# Patient Record
Sex: FEMALE | Race: WHITE | NOT HISPANIC OR LATINO | Employment: OTHER | URBAN - METROPOLITAN AREA
[De-identification: names, ages, dates, MRNs, and addresses within clinical notes are randomized per-mention and may not be internally consistent; named-entity substitution may affect disease eponyms.]

---

## 2023-04-18 ENCOUNTER — HOSPITAL ENCOUNTER (OUTPATIENT)
Dept: DATA CONVERSION | Facility: HOSPITAL | Age: 73
End: 2023-04-18
Attending: ORTHOPAEDIC SURGERY | Admitting: ORTHOPAEDIC SURGERY

## 2023-04-18 DIAGNOSIS — S52.571A OTHER INTRAARTICULAR FRACTURE OF LOWER END OF RIGHT RADIUS, INITIAL ENCOUNTER FOR CLOSED FRACTURE: ICD-10-CM

## 2023-04-18 DIAGNOSIS — Z87.891 PERSONAL HISTORY OF NICOTINE DEPENDENCE: ICD-10-CM

## 2023-04-18 DIAGNOSIS — K21.9 GASTRO-ESOPHAGEAL REFLUX DISEASE WITHOUT ESOPHAGITIS: ICD-10-CM

## 2023-04-18 DIAGNOSIS — E78.5 HYPERLIPIDEMIA, UNSPECIFIED: ICD-10-CM

## 2023-04-18 LAB
ANION GAP IN SER/PLAS: 13 MMOL/L (ref 10–20)
ATRIAL RATE: 77 BPM
CALCIUM (MG/DL) IN SER/PLAS: 9.1 MG/DL (ref 8.6–10.3)
CARBON DIOXIDE, TOTAL (MMOL/L) IN SER/PLAS: 26 MMOL/L (ref 21–32)
CHLORIDE (MMOL/L) IN SER/PLAS: 104 MMOL/L (ref 98–107)
CREATININE (MG/DL) IN SER/PLAS: 0.86 MG/DL (ref 0.5–1.05)
ERYTHROCYTE DISTRIBUTION WIDTH (RATIO) BY AUTOMATED COUNT: 12.7 % (ref 11.5–14.5)
ERYTHROCYTE MEAN CORPUSCULAR HEMOGLOBIN CONCENTRATION (G/DL) BY AUTOMATED: 31.8 G/DL (ref 32–36)
ERYTHROCYTE MEAN CORPUSCULAR VOLUME (FL) BY AUTOMATED COUNT: 87 FL (ref 80–100)
ERYTHROCYTES (10*6/UL) IN BLOOD BY AUTOMATED COUNT: 4.86 X10E12/L (ref 4–5.2)
GFR FEMALE: 71 ML/MIN/1.73M2
GLUCOSE (MG/DL) IN SER/PLAS: 91 MG/DL (ref 74–99)
HEMATOCRIT (%) IN BLOOD BY AUTOMATED COUNT: 42.1 % (ref 36–46)
HEMOGLOBIN (G/DL) IN BLOOD: 13.4 G/DL (ref 12–16)
LEUKOCYTES (10*3/UL) IN BLOOD BY AUTOMATED COUNT: 7.4 X10E9/L (ref 4.4–11.3)
P AXIS: 49 DEGREES
PLATELETS (10*3/UL) IN BLOOD AUTOMATED COUNT: 286 X10E9/L (ref 150–450)
POTASSIUM (MMOL/L) IN SER/PLAS: 4.1 MMOL/L (ref 3.5–5.3)
PR INTERVAL: 152 MS
Q ONSET: 249 MS
QRS COUNT: 12 BEATS
QRS DURATION: 82 MS
QT INTERVAL: 390 MS
QTC CALCULATION(BAZETT): 436 MS
QTC FREDERICIA: 419 MS
R AXIS: 4 DEGREES
SODIUM (MMOL/L) IN SER/PLAS: 139 MMOL/L (ref 136–145)
T AXIS: 36 DEGREES
T OFFSET: 444 MS
UREA NITROGEN (MG/DL) IN SER/PLAS: 16 MG/DL (ref 6–23)
VENTRICULAR RATE: 75 BPM

## 2023-09-07 VITALS — BODY MASS INDEX: 21.48 KG/M2 | WEIGHT: 121.25 LBS | HEIGHT: 63 IN

## 2023-09-14 NOTE — PROGRESS NOTES
Service: Orthopaedics     Subjective Data:   AMIRAH PAGAN is a 72 year old Female who is Hospital Day # 1.    Objective Data:     Objective Information:    ORTHOPEDIC OPERATIVE NOTE    Name: Amirah Pagan  : 50  Surgeon: Ahmet Beavers DO  Facility: [facility]  Date of Surgery: 23     SURGEON:     Ahmet Beavers DO  ASSISTANT:  SA Hercules  PREOPERATIVE DIAGNOSIS: Closed, 3 part intra-articular fracture, right distal radius  POSTOPERATIVE DIAGNOSIS: Closed, 3 part intra-articular fracture, right distal radius  PROCEDURE:   Open reduction internal fixation with volar plate.  ANESTHESIA:    MAC and Block  BLOOD LOSS: Minimal    PROCEDURE: The patient was seen and consented preoperatively with the side and site of surgery appropriately marked. The patient was taken back to operative suite, placed supine on the operative table, and placed on monitor for the duration of the case.  The patient was administered sedation and monitored throughout the entire surgery by Department of Anesthesia. The patient had recieved a block pre-operatively by the department of anesthesia.  While sedated, the right upper extremity was sterilely prepped  and draped in the sterile orthopedic fashion, elevated with an Esmarch, tourniquet inflated to 250 mmHg for duration of case. A time-out was performed confirming the site of surgery and surgery to be performed.    A longitudinal incision was made over the volar radial aspect of the wrist. Dissection was taken through the skin and subcutaneous tissue using electrocautery as necessary. The flexor carpi radialis tendon was identified and its sheath was released as  far as possible. The branch of the radial artery was protected.    An incision was made through the forearm fascia adjacent to the flexor carpi radialis along the radial aspect. Blunt dissection was taken through the muscles until the pronator quadratus was visualized. It was released sharply off the  radius and again,  hemostasis with electrocautery was performed as necessary.    The insertion of the brachioradialis was released to neutralize the displacement force on the distal radial fragment. Care was taken to avoid injury to any of the 1st compartment extensor tendons.    The 3 part intra-articular fracture was identified. The shaft of the radius was gently retracted away from the distal fragment to clear it of fracture hematoma.    Fracture reduction was then performed manually under direct visualization until satisfactory alignment was identified. This was supplemented with K-wires as necessary.    The plate was applied to the volar aspect. C-arm was then used to assure proper alignments. Adjustments were made as necessary. First screw was placed in the oblong hole. The distal screws were then inserted using the guides in the usual manner.    The plate was then reduced along the shaft, further reducing the fracture and reproducing the volar tilt. The shaft of the plate was secured with cortical screws in the usual manner. Once again, the procedure was performed with fluoroscopy to ensure satisfactory  reduction. The pronator quadratus was placed back in its normal position.    At the completion of the procedure, the wrist was placed through range of motion and noted to be essentially normal with good stability of the fracture.  DRUJ was stressed and found to be stable.     The tourniquet was then released. The wound was thoroughly irrigated with antibiotic solution. Hemostasis was acquired with pressure and electrocautery as necessary. The subcutaneous tissue was closed with 4-0 vicryl and the skin was closed with running  5-0 nylon. A soft bulky dressing was applied along with a volar splint. The patient was taken to the recovery room in satisfactory condition.     Electronically signed  Ahmet Beavers DO     Recent Lab Results:    Results:    CBC: 4/18/2023 09:41              \     Hgb     /                               \     13.4       /  WBC  ----------------  Plt               7.4       ----------------    286              /     Hct     \                              /     42.1       \            RBC: 4.86     MCV: 87           BMP: 4/18/2023 09:41  NA+        Cl-     BUN  /                         139    104    16  /  --------------------------------  Glucose                ---------------------------  91    K+     HCO3-   Creat \                         4.1  26    0.86  \  Calcium : 9.1     Anion Gap : 13      Assessment and Plan:   Code Status:  ·  Code Status Full Code     Advance Care Planning:  Advance Care Planning: Patient didn't wish to or  was unable to provide advance care plan       Electronic Signatures:  JULIA Beavers James (DO)  (Signed 18-Apr-2023 11:40)   Authored: Service, Subjective Data, Objective Data, Assessment  and Plan, Note Completion      Last Updated: 18-Apr-2023 11:40 by JULIA Beavers James ()

## 2023-09-14 NOTE — H&P
History & Physical Reviewed:   I have reviewed the History and Physical dated:  10-Apr-2023   History and Physical reviewed and relevant findings noted. Patient examined to review pertinent physical  findings.: No significant changes   Home Medications Reviewed: no changes noted   Allergies Reviewed: no changes noted       ERAS (Enhanced Recovery After Surgery):  ·  ERAS Patient: no     Consent:   COVID-19 Consent:  ·  COVID-19 Risk Consent Surgeon has reviewed key risks related to the risk of joseph COVID-19 and if they contract COVID-19 what the risks are.       Electronic Signatures:  JULIA Beavers James ()  (Signed 18-Apr-2023 10:43)   Authored: History & Physical Reviewed, ERAS, Consent,  Note Completion      Last Updated: 18-Apr-2023 10:43 by JULIA Beavers James ()

## 2023-11-02 ENCOUNTER — TELEPHONE (OUTPATIENT)
Dept: PRIMARY CARE | Facility: CLINIC | Age: 73
End: 2023-11-02

## 2023-11-02 PROBLEM — E78.5 DYSLIPIDEMIA, GOAL LDL BELOW 100: Status: ACTIVE | Noted: 2023-11-02

## 2023-11-02 PROBLEM — K21.9 GERD (GASTROESOPHAGEAL REFLUX DISEASE): Status: ACTIVE | Noted: 2023-11-02

## 2023-11-02 PROBLEM — S01.81XA LACERATION OF FACE: Status: ACTIVE | Noted: 2023-11-02

## 2023-11-02 PROBLEM — M25.60 STIFFNESS IN JOINT: Status: ACTIVE | Noted: 2023-11-02

## 2023-11-02 PROBLEM — S52.571D: Status: ACTIVE | Noted: 2023-11-02

## 2023-11-02 PROBLEM — F32.A ANXIETY AND DEPRESSION: Status: ACTIVE | Noted: 2023-11-02

## 2023-11-02 PROBLEM — M25.572 ACUTE LEFT ANKLE PAIN: Status: ACTIVE | Noted: 2023-11-02

## 2023-11-02 PROBLEM — S62.109A WRIST FRACTURE: Status: ACTIVE | Noted: 2023-11-02

## 2023-11-02 PROBLEM — F41.9 ANXIETY AND DEPRESSION: Status: ACTIVE | Noted: 2023-11-02

## 2023-11-02 PROBLEM — M79.672 ACUTE PAIN OF LEFT FOOT: Status: ACTIVE | Noted: 2023-11-02

## 2023-11-02 PROBLEM — R53.1 WEAKNESS: Status: ACTIVE | Noted: 2023-11-02

## 2023-11-02 PROBLEM — S09.90XA HEAD INJURY, CLOSED: Status: ACTIVE | Noted: 2023-11-02

## 2023-11-02 RX ORDER — SIMVASTATIN 40 MG/1
40 TABLET, FILM COATED ORAL NIGHTLY
COMMUNITY
End: 2024-03-28 | Stop reason: SDUPTHER

## 2023-11-02 RX ORDER — PAROXETINE HYDROCHLORIDE 20 MG/1
20 TABLET, FILM COATED ORAL
COMMUNITY
End: 2023-11-15

## 2023-11-02 NOTE — TELEPHONE ENCOUNTER
Requesting refill on Paroxetine 20 mg  & Simvastatin 40 mg  90 day supply     To [facility]    **She has not been seen since 9-

## 2023-11-15 DIAGNOSIS — F41.9 ANXIETY: ICD-10-CM

## 2023-11-15 RX ORDER — PAROXETINE HYDROCHLORIDE 20 MG/1
20 TABLET, FILM COATED ORAL
Qty: 90 TABLET | Refills: 0 | Status: SHIPPED | OUTPATIENT
Start: 2023-11-15 | End: 2024-03-28 | Stop reason: SDUPTHER

## 2024-02-06 ENCOUNTER — APPOINTMENT (OUTPATIENT)
Dept: PRIMARY CARE | Facility: CLINIC | Age: 74
End: 2024-02-06
Payer: MEDICARE

## 2024-03-28 ENCOUNTER — OFFICE VISIT (OUTPATIENT)
Dept: PRIMARY CARE | Facility: CLINIC | Age: 74
End: 2024-03-28
Payer: MEDICARE

## 2024-03-28 VITALS
WEIGHT: 117 LBS | DIASTOLIC BLOOD PRESSURE: 60 MMHG | HEART RATE: 62 BPM | BODY MASS INDEX: 20.73 KG/M2 | HEIGHT: 63 IN | SYSTOLIC BLOOD PRESSURE: 110 MMHG

## 2024-03-28 DIAGNOSIS — F32.5 MAJOR DEPRESSION IN REMISSION (CMS-HCC): ICD-10-CM

## 2024-03-28 DIAGNOSIS — M80.00XD AGE-RELATED OSTEOPOROSIS WITH CURRENT PATHOLOGICAL FRACTURE WITH ROUTINE HEALING: ICD-10-CM

## 2024-03-28 DIAGNOSIS — Z12.11 COLON CANCER SCREENING: ICD-10-CM

## 2024-03-28 DIAGNOSIS — K21.9 GASTROESOPHAGEAL REFLUX DISEASE WITHOUT ESOPHAGITIS: ICD-10-CM

## 2024-03-28 DIAGNOSIS — E78.5 DYSLIPIDEMIA, GOAL LDL BELOW 100: ICD-10-CM

## 2024-03-28 DIAGNOSIS — Z12.31 SCREENING MAMMOGRAM FOR BREAST CANCER: ICD-10-CM

## 2024-03-28 DIAGNOSIS — E55.9 VITAMIN D DEFICIENCY: ICD-10-CM

## 2024-03-28 DIAGNOSIS — N39.3 STRESS INCONTINENCE IN FEMALE: ICD-10-CM

## 2024-03-28 DIAGNOSIS — F41.9 ANXIETY: ICD-10-CM

## 2024-03-28 DIAGNOSIS — Z00.00 MEDICARE ANNUAL WELLNESS VISIT, SUBSEQUENT: Primary | ICD-10-CM

## 2024-03-28 PROBLEM — S09.90XA HEAD INJURY, CLOSED: Status: RESOLVED | Noted: 2023-11-02 | Resolved: 2024-03-28

## 2024-03-28 PROBLEM — M81.0 AGE-RELATED OSTEOPOROSIS WITHOUT CURRENT PATHOLOGICAL FRACTURE: Status: ACTIVE | Noted: 2024-03-28

## 2024-03-28 PROBLEM — F32.A ANXIETY AND DEPRESSION: Status: RESOLVED | Noted: 2023-11-02 | Resolved: 2024-03-28

## 2024-03-28 PROBLEM — M25.60 STIFFNESS IN JOINT: Status: RESOLVED | Noted: 2023-11-02 | Resolved: 2024-03-28

## 2024-03-28 PROBLEM — S01.81XA LACERATION OF FACE: Status: RESOLVED | Noted: 2023-11-02 | Resolved: 2024-03-28

## 2024-03-28 PROBLEM — M79.672 ACUTE PAIN OF LEFT FOOT: Status: RESOLVED | Noted: 2023-11-02 | Resolved: 2024-03-28

## 2024-03-28 PROBLEM — M25.572 ACUTE LEFT ANKLE PAIN: Status: RESOLVED | Noted: 2023-11-02 | Resolved: 2024-03-28

## 2024-03-28 PROBLEM — R53.1 WEAKNESS: Status: RESOLVED | Noted: 2023-11-02 | Resolved: 2024-03-28

## 2024-03-28 PROBLEM — S52.571D: Status: RESOLVED | Noted: 2023-11-02 | Resolved: 2024-03-28

## 2024-03-28 PROBLEM — S62.109A WRIST FRACTURE: Status: RESOLVED | Noted: 2023-11-02 | Resolved: 2024-03-28

## 2024-03-28 PROCEDURE — 1160F RVW MEDS BY RX/DR IN RCRD: CPT | Performed by: INTERNAL MEDICINE

## 2024-03-28 PROCEDURE — 99214 OFFICE O/P EST MOD 30 MIN: CPT | Performed by: INTERNAL MEDICINE

## 2024-03-28 PROCEDURE — 1036F TOBACCO NON-USER: CPT | Performed by: INTERNAL MEDICINE

## 2024-03-28 PROCEDURE — 1159F MED LIST DOCD IN RCRD: CPT | Performed by: INTERNAL MEDICINE

## 2024-03-28 PROCEDURE — G0446 INTENS BEHAVE THER CARDIO DX: HCPCS | Performed by: INTERNAL MEDICINE

## 2024-03-28 PROCEDURE — 99397 PER PM REEVAL EST PAT 65+ YR: CPT | Performed by: INTERNAL MEDICINE

## 2024-03-28 PROCEDURE — G0444 DEPRESSION SCREEN ANNUAL: HCPCS | Performed by: INTERNAL MEDICINE

## 2024-03-28 PROCEDURE — 1170F FXNL STATUS ASSESSED: CPT | Performed by: INTERNAL MEDICINE

## 2024-03-28 PROCEDURE — G0439 PPPS, SUBSEQ VISIT: HCPCS | Performed by: INTERNAL MEDICINE

## 2024-03-28 PROCEDURE — 99497 ADVNCD CARE PLAN 30 MIN: CPT | Performed by: INTERNAL MEDICINE

## 2024-03-28 RX ORDER — PAROXETINE HYDROCHLORIDE 20 MG/1
20 TABLET, FILM COATED ORAL
Qty: 90 TABLET | Refills: 3 | Status: SHIPPED | OUTPATIENT
Start: 2024-03-28 | End: 2025-03-28

## 2024-03-28 RX ORDER — SIMVASTATIN 40 MG/1
40 TABLET, FILM COATED ORAL NIGHTLY
Qty: 90 TABLET | Refills: 3 | Status: SHIPPED | OUTPATIENT
Start: 2024-03-28 | End: 2025-03-28

## 2024-03-28 ASSESSMENT — ACTIVITIES OF DAILY LIVING (ADL)
DRESSING: INDEPENDENT
TAKING_MEDICATION: INDEPENDENT
GROCERY_SHOPPING: INDEPENDENT
DOING_HOUSEWORK: INDEPENDENT
MANAGING_FINANCES: INDEPENDENT
BATHING: INDEPENDENT

## 2024-03-28 ASSESSMENT — PATIENT HEALTH QUESTIONNAIRE - PHQ9
1. LITTLE INTEREST OR PLEASURE IN DOING THINGS: NOT AT ALL
SUM OF ALL RESPONSES TO PHQ9 QUESTIONS 1 AND 2: 0
2. FEELING DOWN, DEPRESSED OR HOPELESS: NOT AT ALL

## 2024-03-28 ASSESSMENT — ANXIETY QUESTIONNAIRES
7. FEELING AFRAID AS IF SOMETHING AWFUL MIGHT HAPPEN: NOT AT ALL
6. BECOMING EASILY ANNOYED OR IRRITABLE: NOT AT ALL
5. BEING SO RESTLESS THAT IT IS HARD TO SIT STILL: NOT AT ALL
2. NOT BEING ABLE TO STOP OR CONTROL WORRYING: NOT AT ALL
1. FEELING NERVOUS, ANXIOUS, OR ON EDGE: SEVERAL DAYS
IF YOU CHECKED OFF ANY PROBLEMS ON THIS QUESTIONNAIRE, HOW DIFFICULT HAVE THESE PROBLEMS MADE IT FOR YOU TO DO YOUR WORK, TAKE CARE OF THINGS AT HOME, OR GET ALONG WITH OTHER PEOPLE: NOT DIFFICULT AT ALL
4. TROUBLE RELAXING: NOT AT ALL
3. WORRYING TOO MUCH ABOUT DIFFERENT THINGS: NOT AT ALL
GAD7 TOTAL SCORE: 1

## 2024-03-28 ASSESSMENT — COLUMBIA-SUICIDE SEVERITY RATING SCALE - C-SSRS
1. IN THE PAST MONTH, HAVE YOU WISHED YOU WERE DEAD OR WISHED YOU COULD GO TO SLEEP AND NOT WAKE UP?: NO
2. HAVE YOU ACTUALLY HAD ANY THOUGHTS OF KILLING YOURSELF?: NO
6. HAVE YOU EVER DONE ANYTHING, STARTED TO DO ANYTHING, OR PREPARED TO DO ANYTHING TO END YOUR LIFE?: NO

## 2024-03-28 ASSESSMENT — ENCOUNTER SYMPTOMS
OCCASIONAL FEELINGS OF UNSTEADINESS: 0
LOSS OF SENSATION IN FEET: 0
DEPRESSION: 0

## 2024-03-28 NOTE — PROGRESS NOTES
"Subjective   Reason for Visit: Jess Pagan is an 73 y.o. female here for a Medicare Wellness visit.     Past Medical, Surgical, and Family History reviewed and updated in chart.    Reviewed all medications by prescribing practitioner or clinical pharmacist (such as prescriptions, OTCs, herbal therapies and supplements) and documented in the medical record.    HPI    Patient Care Team:  Mayo Rosas DO as PCP - General  ROYCE Hahn-CNP as PCP - [employer] PCP     Review of Systems    Objective   Vitals:  /60 (BP Location: Left arm, Patient Position: Sitting)   Pulse 62   Ht 1.6 m (5' 3\")   Wt 53.1 kg (117 lb)   BMI 20.73 kg/m²       Physical Exam    Assessment/Plan   Problem List Items Addressed This Visit    None         "

## 2024-03-28 NOTE — ASSESSMENT & PLAN NOTE
Patient with fragility fracture after sustaining fall approximately 1 year ago with plating of her right wrist by orthopedic surgery stable at this time evaluated for bone density for treatment of osteoporosis

## 2024-03-28 NOTE — PROGRESS NOTES
"Subjective   Reason for Visit: Jess Pagan is an 73 y.o. female here for a Medicare Wellness [facility] Depression and anxiety screening completed   PHQ9 score   GAD7 score   I spent 15 minutes obtaining and discussing depression screening using PHQ 2 questions with results documented in chart.  Screening using PHQ-9 and PRICE-7 scores were used for follow-up with treatment and referral plan discussed.      I spent 15 minutes face-to-face with this individual discussing the cardiovascular risk and behavioral therapies of nutritional choices exercise and elimination of habits contributing to risk .We agreed on a plan how they may be able to reduce  current cardiovascular risk.  Per patient with calculation greater than 10% aspirin use was discussed and encouraged unless known allergy or increased risk of bleeding contraindicates use patient's 10-year CV risk estimate calculates:I spent greater than 15 minutes discussing advance care planning including the explanation and discussion of advanced directives.  If patient does not have current up-to-date documents examples and information provided on how to create both living will and power of .  toolkit was given to patient and was encouraged to work out completing these documents.visit.     Past Medical, Surgical, and Family History reviewed and updated in chart.    Reviewed all medications by prescribing practitioner or clinical pharmacist (such as prescriptions, OTCs, herbal therapies and supplements) and documented in the medical record.    HPI    Patient Care Team:  Mayo Rosas DO as PCP - General  ROYCE Hahn-CNP as PCP - [employer] PCP     Review of Systems   All other systems reviewed and are negative.      Objective   Vitals:  /60 (BP Location: Left arm, Patient Position: Sitting)   Pulse 62   Ht 1.6 m (5' 3\")   Wt 53.1 kg (117 lb)   BMI 20.73 kg/m²       Physical Exam  Vitals and nursing note " reviewed.   Constitutional:       General: She is not in acute distress.     Appearance: Normal appearance. She is well-developed. She is not toxic-appearing.   HENT:      Head: Normocephalic and atraumatic.      Right Ear: Tympanic membrane and external ear normal.      Left Ear: Tympanic membrane and external ear normal.      Nose: Nose normal.      Mouth/Throat:      Mouth: Mucous membranes are moist.      Pharynx: Oropharynx is clear. No oropharyngeal exudate or posterior oropharyngeal erythema.      Tonsils: No tonsillar exudate. 2+ on the right. 2+ on the left.   Eyes:      Extraocular Movements: Extraocular movements intact.      Conjunctiva/sclera: Conjunctivae normal.   Cardiovascular:      Rate and Rhythm: Normal rate and regular rhythm.      Pulses: Normal pulses.      Heart sounds: Normal heart sounds. No murmur heard.  Pulmonary:      Effort: Pulmonary effort is normal.      Breath sounds: Normal breath sounds.   Abdominal:      General: Abdomen is flat. Bowel sounds are normal.      Palpations: Abdomen is soft.   Musculoskeletal:      Cervical back: Neck supple.   Lymphadenopathy:      Cervical: No cervical adenopathy.   Skin:     General: Skin is warm and dry.      Findings: No rash.   Neurological:      Mental Status: She is alert. Mental status is at baseline.   Psychiatric:         Mood and Affect: Mood normal.         Behavior: Behavior normal.         Thought Content: Thought content normal.         Judgment: Judgment normal.         Assessment/Plan   Problem List Items Addressed This Visit       Anxiety    Relevant Medications    PARoxetine (Paxil) 20 mg tablet    Dyslipidemia, goal LDL below 100    Current Assessment & Plan     Reevaluate fasting lab work on simvastatin 40 mg daily for reduction cardiometabolic risk         Relevant Medications    simvastatin (Zocor) 40 mg tablet    Other Relevant Orders    BI mammo bilateral screening tomosynthesis    XR DEXA bone density    Cologuard® colon  cancer screening    Hepatitis C antibody    Vitamin D 25-Hydroxy,Total (for eval of Vitamin D levels)    Comprehensive metabolic panel    Lipid Panel    CBC and Auto Differential    Follow Up In [facility] - PCP - Established    GERD (gastroesophageal reflux disease)    Current Assessment & Plan     Symptoms controlled on as needed use of over-the-counter omeprazole 20 mg daily         Medicare annual wellness visit, subsequent - Primary    Current Assessment & Plan     Up-to-date with vaccinations.  Schedule annual screening mammogram.  Bone density scheduled for osteoporosis.  Cologuard testing for colon cancer screening.  Send advanced medical directives paperwork to evaluate for next visit discussed advanced medical directives         Age-related osteoporosis with current pathological fracture with routine healing    Current Assessment & Plan     Patient with fragility fracture after sustaining fall approximately 1 year ago with plating of her right wrist by orthopedic surgery stable at this time evaluated for bone density for treatment of osteoporosis         Relevant Orders    Follow Up In [facility] - PCP - Established    Major depression in remission (CMS/HCC)    Current Assessment & Plan     Stable continue paroxetine 20 mg daily         Relevant Orders    Follow Up In [facility] - PCP - Established    Colon cancer screening    Relevant Orders    Cologuard® colon cancer screening    Vitamin D deficiency    Current Assessment & Plan     Evaluate vitamin D levels reevaluate bone density for osteoporosis with recent fracture of right wrist         Relevant Orders    Vitamin D 25-Hydroxy,Total (for eval of Vitamin D levels)    Screening mammogram for breast cancer    Relevant Orders    BI mammo bilateral screening tomosynthesis    Stress incontinence in female    Current Assessment & Plan     Have patient complete paperwork on enrolling in empower study reduce caffeine intake  drinks 1 to 2 cups of coffee a day occasional carbonated beverage could benefit from pelvic floor rehabilitation exercises rehabilitation physical therapy reevaluate next visit

## 2024-03-28 NOTE — ASSESSMENT & PLAN NOTE
Evaluate vitamin D levels reevaluate bone density for osteoporosis with recent fracture of right wrist

## 2024-03-28 NOTE — ASSESSMENT & PLAN NOTE
Up-to-date with vaccinations.  Schedule annual screening mammogram.  Bone density scheduled for osteoporosis.  Cologuard testing for colon cancer screening.  Send advanced medical directives paperwork to evaluate for next visit discussed advanced medical directives

## 2024-03-28 NOTE — ASSESSMENT & PLAN NOTE
Have patient complete paperwork on enrolling in empower study reduce caffeine intake drinks 1 to 2 cups of coffee a day occasional carbonated beverage could benefit from pelvic floor rehabilitation exercises rehabilitation physical therapy reevaluate next visit

## 2024-04-10 LAB — NONINV COLON CA DNA+OCC BLD SCRN STL QL: NEGATIVE

## 2024-05-06 ENCOUNTER — HOSPITAL ENCOUNTER (OUTPATIENT)
Dept: RADIOLOGY | Facility: CLINIC | Age: 74
Discharge: HOME | End: 2024-05-06
Payer: MEDICARE

## 2024-05-06 VITALS — HEIGHT: 62 IN | WEIGHT: 117 LBS | BODY MASS INDEX: 21.53 KG/M2

## 2024-05-06 DIAGNOSIS — E78.5 DYSLIPIDEMIA, GOAL LDL BELOW 100: ICD-10-CM

## 2024-05-06 DIAGNOSIS — Z12.31 SCREENING MAMMOGRAM FOR BREAST CANCER: ICD-10-CM

## 2024-05-06 PROCEDURE — 77067 SCR MAMMO BI INCL CAD: CPT

## 2024-05-06 PROCEDURE — 77080 DXA BONE DENSITY AXIAL: CPT

## 2024-05-06 PROCEDURE — 77063 BREAST TOMOSYNTHESIS BI: CPT | Performed by: RADIOLOGY

## 2024-05-06 PROCEDURE — 77067 SCR MAMMO BI INCL CAD: CPT | Performed by: RADIOLOGY

## 2024-05-10 ENCOUNTER — TELEPHONE (OUTPATIENT)
Dept: PRIMARY CARE | Facility: CLINIC | Age: 74
End: 2024-05-10
Payer: MEDICARE

## 2024-07-03 ENCOUNTER — HOSPITAL ENCOUNTER (OUTPATIENT)
Dept: RADIOLOGY | Facility: CLINIC | Age: 74
Discharge: HOME | End: 2024-07-03
Payer: MEDICARE

## 2024-07-03 PROCEDURE — 77080 DXA BONE DENSITY AXIAL: CPT | Mod: RSC

## 2024-09-19 ENCOUNTER — LAB (OUTPATIENT)
Dept: LAB | Facility: LAB | Age: 74
End: 2024-09-19
Payer: MEDICARE

## 2024-09-19 DIAGNOSIS — E55.9 VITAMIN D DEFICIENCY: ICD-10-CM

## 2024-09-19 DIAGNOSIS — E78.5 DYSLIPIDEMIA, GOAL LDL BELOW 100: ICD-10-CM

## 2024-09-19 LAB
25(OH)D3 SERPL-MCNC: 56 NG/ML (ref 30–100)
ALBUMIN SERPL BCP-MCNC: 4.2 G/DL (ref 3.4–5)
ALP SERPL-CCNC: 83 U/L (ref 33–136)
ALT SERPL W P-5'-P-CCNC: 11 U/L (ref 7–45)
ANION GAP SERPL CALC-SCNC: 10 MMOL/L (ref 10–20)
AST SERPL W P-5'-P-CCNC: 20 U/L (ref 9–39)
BASOPHILS # BLD AUTO: 0.07 X10*3/UL (ref 0–0.1)
BASOPHILS NFR BLD AUTO: 1.2 %
BILIRUB SERPL-MCNC: 0.3 MG/DL (ref 0–1.2)
BUN SERPL-MCNC: 12 MG/DL (ref 6–23)
CALCIUM SERPL-MCNC: 9 MG/DL (ref 8.6–10.3)
CHLORIDE SERPL-SCNC: 104 MMOL/L (ref 98–107)
CHOLEST SERPL-MCNC: 190 MG/DL (ref 0–199)
CHOLESTEROL/HDL RATIO: 2.8
CO2 SERPL-SCNC: 31 MMOL/L (ref 21–32)
CREAT SERPL-MCNC: 0.89 MG/DL (ref 0.5–1.05)
EGFRCR SERPLBLD CKD-EPI 2021: 68 ML/MIN/1.73M*2
EOSINOPHIL # BLD AUTO: 0.21 X10*3/UL (ref 0–0.4)
EOSINOPHIL NFR BLD AUTO: 3.5 %
ERYTHROCYTE [DISTWIDTH] IN BLOOD BY AUTOMATED COUNT: 13.2 % (ref 11.5–14.5)
GLUCOSE SERPL-MCNC: 90 MG/DL (ref 74–99)
HCT VFR BLD AUTO: 43.2 % (ref 36–46)
HCV AB SER QL: NONREACTIVE
HDLC SERPL-MCNC: 68.9 MG/DL
HGB BLD-MCNC: 13.5 G/DL (ref 12–16)
IMM GRANULOCYTES # BLD AUTO: 0.03 X10*3/UL (ref 0–0.5)
IMM GRANULOCYTES NFR BLD AUTO: 0.5 % (ref 0–0.9)
LDLC SERPL CALC-MCNC: 100 MG/DL
LYMPHOCYTES # BLD AUTO: 2.65 X10*3/UL (ref 0.8–3)
LYMPHOCYTES NFR BLD AUTO: 44.8 %
MCH RBC QN AUTO: 27.9 PG (ref 26–34)
MCHC RBC AUTO-ENTMCNC: 31.3 G/DL (ref 32–36)
MCV RBC AUTO: 89 FL (ref 80–100)
MONOCYTES # BLD AUTO: 0.41 X10*3/UL (ref 0.05–0.8)
MONOCYTES NFR BLD AUTO: 6.9 %
NEUTROPHILS # BLD AUTO: 2.55 X10*3/UL (ref 1.6–5.5)
NEUTROPHILS NFR BLD AUTO: 43.1 %
NON HDL CHOLESTEROL: 121 MG/DL (ref 0–149)
NRBC BLD-RTO: 0 /100 WBCS (ref 0–0)
PLATELET # BLD AUTO: 270 X10*3/UL (ref 150–450)
POTASSIUM SERPL-SCNC: 4.7 MMOL/L (ref 3.5–5.3)
PROT SERPL-MCNC: 6.6 G/DL (ref 6.4–8.2)
RBC # BLD AUTO: 4.84 X10*6/UL (ref 4–5.2)
SODIUM SERPL-SCNC: 140 MMOL/L (ref 136–145)
TRIGL SERPL-MCNC: 107 MG/DL (ref 0–149)
VLDL: 21 MG/DL (ref 0–40)
WBC # BLD AUTO: 5.9 X10*3/UL (ref 4.4–11.3)

## 2024-09-19 PROCEDURE — 82306 VITAMIN D 25 HYDROXY: CPT

## 2024-09-19 PROCEDURE — 80053 COMPREHEN METABOLIC PANEL: CPT

## 2024-09-19 PROCEDURE — 86803 HEPATITIS C AB TEST: CPT

## 2024-09-19 PROCEDURE — 80061 LIPID PANEL: CPT

## 2024-09-19 PROCEDURE — 85025 COMPLETE CBC W/AUTO DIFF WBC: CPT

## 2024-09-19 PROCEDURE — 36415 COLL VENOUS BLD VENIPUNCTURE: CPT

## 2024-09-26 ENCOUNTER — APPOINTMENT (OUTPATIENT)
Dept: PRIMARY CARE | Facility: CLINIC | Age: 74
End: 2024-09-26
Payer: MEDICARE

## 2024-09-26 VITALS
HEART RATE: 68 BPM | DIASTOLIC BLOOD PRESSURE: 62 MMHG | BODY MASS INDEX: 21.71 KG/M2 | WEIGHT: 118 LBS | SYSTOLIC BLOOD PRESSURE: 110 MMHG | HEIGHT: 62 IN

## 2024-09-26 DIAGNOSIS — N39.46 MIXED STRESS AND URGE URINARY INCONTINENCE: ICD-10-CM

## 2024-09-26 DIAGNOSIS — F32.5 MAJOR DEPRESSION IN REMISSION (CMS-HCC): ICD-10-CM

## 2024-09-26 DIAGNOSIS — E78.5 DYSLIPIDEMIA, GOAL LDL BELOW 100: ICD-10-CM

## 2024-09-26 DIAGNOSIS — M80.00XD AGE-RELATED OSTEOPOROSIS WITH CURRENT PATHOLOGICAL FRACTURE WITH ROUTINE HEALING: Primary | ICD-10-CM

## 2024-09-26 DIAGNOSIS — E55.9 VITAMIN D DEFICIENCY: ICD-10-CM

## 2024-09-26 PROBLEM — F41.9 ANXIETY: Status: RESOLVED | Noted: 2023-11-02 | Resolved: 2024-09-26

## 2024-09-26 PROCEDURE — 1159F MED LIST DOCD IN RCRD: CPT | Performed by: INTERNAL MEDICINE

## 2024-09-26 PROCEDURE — 1123F ACP DISCUSS/DSCN MKR DOCD: CPT | Performed by: INTERNAL MEDICINE

## 2024-09-26 PROCEDURE — 99213 OFFICE O/P EST LOW 20 MIN: CPT | Performed by: INTERNAL MEDICINE

## 2024-09-26 PROCEDURE — 1158F ADVNC CARE PLAN TLK DOCD: CPT | Performed by: INTERNAL MEDICINE

## 2024-09-26 PROCEDURE — 3008F BODY MASS INDEX DOCD: CPT | Performed by: INTERNAL MEDICINE

## 2024-09-26 PROCEDURE — 1160F RVW MEDS BY RX/DR IN RCRD: CPT | Performed by: INTERNAL MEDICINE

## 2024-09-26 PROCEDURE — 1036F TOBACCO NON-USER: CPT | Performed by: INTERNAL MEDICINE

## 2024-09-26 RX ORDER — ALENDRONATE SODIUM 70 MG/1
70 TABLET ORAL
Qty: 12 TABLET | Refills: 3 | Status: SHIPPED | OUTPATIENT
Start: 2024-09-26 | End: 2025-09-26

## 2024-09-26 ASSESSMENT — ANXIETY QUESTIONNAIRES
IF YOU CHECKED OFF ANY PROBLEMS ON THIS QUESTIONNAIRE, HOW DIFFICULT HAVE THESE PROBLEMS MADE IT FOR YOU TO DO YOUR WORK, TAKE CARE OF THINGS AT HOME, OR GET ALONG WITH OTHER PEOPLE: NOT DIFFICULT AT ALL
7. FEELING AFRAID AS IF SOMETHING AWFUL MIGHT HAPPEN: NOT AT ALL
5. BEING SO RESTLESS THAT IT IS HARD TO SIT STILL: NOT AT ALL
2. NOT BEING ABLE TO STOP OR CONTROL WORRYING: NOT AT ALL
3. WORRYING TOO MUCH ABOUT DIFFERENT THINGS: NOT AT ALL
1. FEELING NERVOUS, ANXIOUS, OR ON EDGE: NOT AT ALL
6. BECOMING EASILY ANNOYED OR IRRITABLE: NOT AT ALL
GAD7 TOTAL SCORE: 0
4. TROUBLE RELAXING: NOT AT ALL

## 2024-09-26 ASSESSMENT — PATIENT HEALTH QUESTIONNAIRE - PHQ9
2. FEELING DOWN, DEPRESSED OR HOPELESS: NOT AT ALL
SUM OF ALL RESPONSES TO PHQ9 QUESTIONS 1 AND 2: 0
1. LITTLE INTEREST OR PLEASURE IN DOING THINGS: NOT AT ALL

## 2024-09-26 NOTE — ASSESSMENT & PLAN NOTE
Stable in remission continue paroxetine 20 mg daily  Orders:    Follow Up In [facility] - PCP - \[facility]\""    alendronate (Fosamax) 70 mg tablet; Take 1 tablet (70 mg) by mouth every 7 days. Take in the morning with a full glass of water, on an empty stomach, and do not take anything else by mouth or lie down for the next 30 min.    Vitamin D 25-Hydroxy,Total (for eval of Vitamin D levels); Future    Comprehensive metabolic panel; Future    Lipid Panel; Future    Follow Up In [facility] - PCP - Medicare Annual; Future

## 2024-09-26 NOTE — ASSESSMENT & PLAN NOTE
Optimal LDL cholesterol continue risk reduction with simvastatin 40 mg daily LDL cholesterol improved to 100 with HDL 68 and triglyceride level of 107 no impaired fasting sugars  Orders:    Follow Up In [facility] - [facility]CP - [facility]    alendronate (Fosamax) 70 mg tablet; Take 1 tablet (70 mg) by mouth every 7 days. Take in the morning with a full glass of water, on an empty stomach, and do not take anything else by mouth or lie down for the next 30 min.    Vitamin D 25-Hydroxy,Total (for eval of Vitamin D levels); Future    Comprehensive metabolic panel; Future    Lipid Panel; Future    Follow Up In [facility] - PCP - Medicare Annual; Future

## 2024-09-26 NOTE — ASSESSMENT & PLAN NOTE
Bone density completed revealed osteoporosis -3.0 femoral neck reinitiate alendronate 70 mg weekly denies about not covered by insurance reevaluate with vitamin D levels in 6 months  Orders:    Follow Up In [facility] - PCP - [facility]    alendronate (Fosamax) 70 mg tablet; Take 1 tablet (70 mg) by mouth every 7 days. Take in the morning with a full glass of water, on an empty stomach, and do not take anything else by mouth or lie down for the next 30 min.    Vitamin D 25-Hydroxy,Total (for eval of Vitamin D levels); Future    Comprehensive metabolic panel; Future    Lipid Panel; Future    Follow Up In [facility] - PCP - Medicare Annual; Future

## 2024-09-26 NOTE — PROGRESS NOTES
"Subjective   Reason for Visit: Jess Pagan is an 74 y.o. female here for a Medicare Wellness visit.     Past Medical, Surgical, and Family History reviewed and updated in chart.    Reviewed all medications by prescribing practitioner or clinical pharmacist (such as prescriptions, OTCs, herbal therapies and supplements) and documented in the medical record.    HPI    Patient Care Team:  Mayo Rosas DO as PCP - General  Mayo Rosas DO as PCP - [employer] PCP     Review of Systems   All other systems reviewed and are negative.      Objective   Vitals:  /62   Pulse 68   Ht 1.575 m (5' 2\")   Wt 53.5 kg (118 lb)   BMI 21.58 kg/m²       Physical Exam  Vitals and nursing note reviewed.   Constitutional:       General: She is not in acute distress.     Appearance: Normal appearance. She is well-developed. She is not toxic-appearing.   HENT:      Head: Normocephalic and atraumatic.      Right Ear: Tympanic membrane and external ear normal.      Left Ear: Tympanic membrane and external ear normal.      Nose: Nose normal.      Mouth/Throat:      Mouth: Mucous membranes are moist.      Pharynx: Oropharynx is clear. No oropharyngeal exudate or posterior oropharyngeal erythema.      Tonsils: No tonsillar exudate. 2+ on the right. 2+ on the left.   Eyes:      Extraocular Movements: Extraocular movements intact.      Conjunctiva/sclera: Conjunctivae normal.   Cardiovascular:      Rate and Rhythm: Normal rate and regular rhythm.      Pulses: Normal pulses.      Heart sounds: Normal heart sounds. No murmur heard.  Pulmonary:      Effort: Pulmonary effort is normal.      Breath sounds: Normal breath sounds.   Abdominal:      General: Abdomen is flat. Bowel sounds are normal.      Palpations: Abdomen is soft.   Musculoskeletal:      Cervical back: Neck supple.   Lymphadenopathy:      Cervical: No cervical adenopathy.   Skin:     General: Skin is warm and dry.      Findings: No rash. "   Neurological:      Mental Status: She is alert. Mental status is at baseline.   Psychiatric:         Mood and Affect: Mood normal.         Behavior: Behavior normal.         Thought Content: Thought content normal.         Judgment: Judgment normal.         Assessment & Plan  Dyslipidemia, goal LDL below 100  Optimal LDL cholesterol continue risk reduction with simvastatin 40 mg daily LDL cholesterol improved to 100 with HDL 68 and triglyceride level of 107 no impaired fasting sugars  Orders:    Follow Up In [facility] - PCP - Established    alendronate (Fosamax) 70 mg tablet; Take 1 tablet (70 mg) by mouth every 7 days. Take in the morning with a full glass of water, on an empty stomach, and do not take anything else by mouth or lie down for the next 30 min.    Vitamin D 25-Hydroxy,Total (for eval of Vitamin D levels); Future    Comprehensive metabolic panel; Future    Lipid Panel; Future    Follow Up In [facility] - PCP - Medicare Annual; Future    Major depression in remission (CMS-[facility])  Stable in remission continue paroxetine 20 mg daily  Orders:    Follow Up In [facility] - PCP - Established    alendronate (Fosamax) 70 mg tablet; Take 1 tablet (70 mg) by mouth every 7 days. Take in the morning with a full glass of water, on an empty stomach, and do not take anything else by mouth or lie down for the next 30 min.    Vitamin D 25-Hydroxy,Total (for eval of Vitamin D levels); Future    Comprehensive metabolic panel; Future    Lipid Panel; Future    Follow Up In [facility] - PCP - Medicare Annual; Future    Age-related osteoporosis with current pathological fracture with routine healing  Bone density completed revealed osteoporosis -3.0 femoral neck reinitiate alendronate 70 mg weekly denies about not covered by insurance reevaluate with vitamin D levels in 6 months  Orders:    Follow Up In [facility] - [facility] - Established    alendronate (Fosamax) 70 mg tablet;  Take 1 tablet (70 mg) by mouth every 7 days. Take in the morning with a full glass of water, on an empty stomach, and do not take anything else by mouth or lie down for the next 30 min.    Vitamin D 25-Hydroxy,Total (for eval of Vitamin D levels); Future    Comprehensive metabolic panel; Future    Lipid Panel; Future    Follow Up In [facility] - PCP - Medicare Annual; Future    Mixed stress and urge urinary incontinence    Orders:    alendronate (Fosamax) 70 mg tablet; Take 1 tablet (70 mg) by mouth every 7 days. Take in the morning with a full glass of water, on an empty stomach, and do not take anything else by mouth or lie down for the next 30 min.    Vitamin D 25-Hydroxy,Total (for eval of Vitamin D levels); Future    Comprehensive metabolic panel; Future    Lipid Panel; Future    Follow Up In [facility] - PCP - Medicare Annual; Future    Vitamin D deficiency    Orders:    alendronate (Fosamax) 70 mg tablet; Take 1 tablet (70 mg) by mouth every 7 days. Take in the morning with a full glass of water, on an empty stomach, and do not take anything else by mouth or lie down for the next 30 min.    Vitamin D 25-Hydroxy,Total (for eval of Vitamin D levels); Future    Comprehensive metabolic panel; Future    Lipid Panel; Future    Follow Up In [facility] - PCP - Medicare Annual; Future

## 2024-09-26 NOTE — ASSESSMENT & PLAN NOTE
Orders:    alendronate (Fosamax) 70 mg tablet; Take 1 tablet (70 mg) by mouth every 7 days. Take in the morning with a full glass of water, on an empty stomach, and do not take anything else by mouth or lie down for the next 30 min.    Vitamin D 25-Hydroxy,Total (for eval of Vitamin D levels); Future    Comprehensive metabolic panel; Future    Lipid Panel; Future    Follow Up In [facility] - PCP - Medicare Annual; Future

## 2024-09-26 NOTE — PROGRESS NOTES
"Subjective   Patient ID: Jess Pagan is a 74 y.o. female who presents for Follow-up.    HPI     Review of Systems    Objective   /62   Pulse 68   Ht 1.575 m (5' 2\")   Wt 53.5 kg (118 lb)   BMI 21.58 kg/m²     Physical Exam    Assessment/Plan          "

## 2024-11-18 ENCOUNTER — TELEPHONE (OUTPATIENT)
Dept: PRIMARY CARE | Facility: CLINIC | Age: 74
End: 2024-11-18
Payer: MEDICARE

## 2024-11-18 NOTE — TELEPHONE ENCOUNTER
She called and is having night sweats and she read that could be a sing of cancer or could it be something else that she should be worried about please advise and call her at [phone].

## 2024-11-20 NOTE — TELEPHONE ENCOUNTER
She called back and she is not sure for how long. And she is waking up and her legs were drench and the other night it was her body. No fever. No her lymph nodes are not tender but her sister  from lymph cancer 10 yrs ago. Feels fine and she is not losing radha but if she misses a couple of meal she will lose a some weight and her radha is going from 125 to 115 with in the last year. No Hot Flashes. Yes she has missed a does of the Paroxetine here and there. Please advise and call her back at [phone]

## 2025-03-12 DIAGNOSIS — F41.9 ANXIETY: ICD-10-CM

## 2025-03-12 DIAGNOSIS — E78.5 DYSLIPIDEMIA, GOAL LDL BELOW 100: ICD-10-CM

## 2025-03-12 RX ORDER — SIMVASTATIN 40 MG/1
40 TABLET, FILM COATED ORAL NIGHTLY
Qty: 90 TABLET | Refills: 0 | Status: SHIPPED | OUTPATIENT
Start: 2025-03-12

## 2025-03-12 RX ORDER — PAROXETINE HYDROCHLORIDE 20 MG/1
20 TABLET, FILM COATED ORAL
Qty: 90 TABLET | Refills: 0 | Status: SHIPPED | OUTPATIENT
Start: 2025-03-12

## 2025-03-27 LAB
25(OH)D3+25(OH)D2 SERPL-MCNC: 52 NG/ML (ref 30–100)
ALBUMIN SERPL-MCNC: 4.6 G/DL (ref 3.6–5.1)
ALP SERPL-CCNC: 82 U/L (ref 37–153)
ALT SERPL-CCNC: 11 U/L (ref 6–29)
ANION GAP SERPL CALCULATED.4IONS-SCNC: 9 MMOL/L (CALC) (ref 7–17)
AST SERPL-CCNC: 18 U/L (ref 10–35)
BILIRUB SERPL-MCNC: 0.5 MG/DL (ref 0.2–1.2)
BUN SERPL-MCNC: 10 MG/DL (ref 7–25)
CALCIUM SERPL-MCNC: 9.6 MG/DL (ref 8.6–10.4)
CHLORIDE SERPL-SCNC: 104 MMOL/L (ref 98–110)
CHOLEST SERPL-MCNC: 184 MG/DL
CHOLEST/HDLC SERPL: 2.7 (CALC)
CO2 SERPL-SCNC: 29 MMOL/L (ref 20–32)
CREAT SERPL-MCNC: 0.87 MG/DL (ref 0.6–1)
EGFRCR SERPLBLD CKD-EPI 2021: 70 ML/MIN/1.73M2
GLUCOSE SERPL-MCNC: 100 MG/DL (ref 65–99)
HDLC SERPL-MCNC: 68 MG/DL
LDLC SERPL CALC-MCNC: 93 MG/DL (CALC)
NONHDLC SERPL-MCNC: 116 MG/DL (CALC)
POTASSIUM SERPL-SCNC: 4.2 MMOL/L (ref 3.5–5.3)
PROT SERPL-MCNC: 7 G/DL (ref 6.1–8.1)
SODIUM SERPL-SCNC: 142 MMOL/L (ref 135–146)
TRIGL SERPL-MCNC: 134 MG/DL

## 2025-04-03 ENCOUNTER — APPOINTMENT (OUTPATIENT)
Dept: PRIMARY CARE | Facility: CLINIC | Age: 75
End: 2025-04-03
Payer: MEDICARE

## 2025-04-03 VITALS
HEART RATE: 66 BPM | HEIGHT: 62 IN | DIASTOLIC BLOOD PRESSURE: 78 MMHG | WEIGHT: 120 LBS | SYSTOLIC BLOOD PRESSURE: 110 MMHG | BODY MASS INDEX: 22.08 KG/M2

## 2025-04-03 DIAGNOSIS — N39.46 MIXED STRESS AND URGE URINARY INCONTINENCE: ICD-10-CM

## 2025-04-03 DIAGNOSIS — E55.9 VITAMIN D DEFICIENCY: ICD-10-CM

## 2025-04-03 DIAGNOSIS — E78.5 DYSLIPIDEMIA, GOAL LDL BELOW 100: ICD-10-CM

## 2025-04-03 DIAGNOSIS — F32.5 MAJOR DEPRESSION IN REMISSION (CMS-HCC): ICD-10-CM

## 2025-04-03 DIAGNOSIS — Z00.00 MEDICARE ANNUAL WELLNESS VISIT, SUBSEQUENT: Primary | ICD-10-CM

## 2025-04-03 DIAGNOSIS — R61 UNEXPLAINED NIGHT SWEATS: ICD-10-CM

## 2025-04-03 DIAGNOSIS — F41.9 ANXIETY: ICD-10-CM

## 2025-04-03 DIAGNOSIS — Z12.31 SCREENING MAMMOGRAM FOR BREAST CANCER: ICD-10-CM

## 2025-04-03 DIAGNOSIS — M80.00XD AGE-RELATED OSTEOPOROSIS WITH CURRENT PATHOLOGICAL FRACTURE WITH ROUTINE HEALING: ICD-10-CM

## 2025-04-03 PROBLEM — Z85.41 HX OF CERVICAL CANCER: Status: ACTIVE | Noted: 2025-04-03

## 2025-04-03 PROCEDURE — 1123F ACP DISCUSS/DSCN MKR DOCD: CPT | Performed by: INTERNAL MEDICINE

## 2025-04-03 PROCEDURE — G2211 COMPLEX E/M VISIT ADD ON: HCPCS | Performed by: INTERNAL MEDICINE

## 2025-04-03 PROCEDURE — 1159F MED LIST DOCD IN RCRD: CPT | Performed by: INTERNAL MEDICINE

## 2025-04-03 PROCEDURE — 1170F FXNL STATUS ASSESSED: CPT | Performed by: INTERNAL MEDICINE

## 2025-04-03 PROCEDURE — 1158F ADVNC CARE PLAN TLK DOCD: CPT | Performed by: INTERNAL MEDICINE

## 2025-04-03 PROCEDURE — 99213 OFFICE O/P EST LOW 20 MIN: CPT | Performed by: INTERNAL MEDICINE

## 2025-04-03 PROCEDURE — 1036F TOBACCO NON-USER: CPT | Performed by: INTERNAL MEDICINE

## 2025-04-03 PROCEDURE — G0439 PPPS, SUBSEQ VISIT: HCPCS | Performed by: INTERNAL MEDICINE

## 2025-04-03 PROCEDURE — 3008F BODY MASS INDEX DOCD: CPT | Performed by: INTERNAL MEDICINE

## 2025-04-03 PROCEDURE — G0444 DEPRESSION SCREEN ANNUAL: HCPCS | Performed by: INTERNAL MEDICINE

## 2025-04-03 PROCEDURE — 1160F RVW MEDS BY RX/DR IN RCRD: CPT | Performed by: INTERNAL MEDICINE

## 2025-04-03 PROCEDURE — 99497 ADVNCD CARE PLAN 30 MIN: CPT | Performed by: INTERNAL MEDICINE

## 2025-04-03 PROCEDURE — 99397 PER PM REEVAL EST PAT 65+ YR: CPT | Performed by: INTERNAL MEDICINE

## 2025-04-03 RX ORDER — PAROXETINE HYDROCHLORIDE 20 MG/1
10 TABLET, FILM COATED ORAL
Qty: 90 TABLET | Refills: 3 | Status: SHIPPED | OUTPATIENT
Start: 2025-04-03 | End: 2026-04-03

## 2025-04-03 ASSESSMENT — ANXIETY QUESTIONNAIRES
GAD7 TOTAL SCORE: 0
7. FEELING AFRAID AS IF SOMETHING AWFUL MIGHT HAPPEN: NOT AT ALL
2. NOT BEING ABLE TO STOP OR CONTROL WORRYING: NOT AT ALL
5. BEING SO RESTLESS THAT IT IS HARD TO SIT STILL: NOT AT ALL
1. FEELING NERVOUS, ANXIOUS, OR ON EDGE: NOT AT ALL
3. WORRYING TOO MUCH ABOUT DIFFERENT THINGS: NOT AT ALL
IF YOU CHECKED OFF ANY PROBLEMS ON THIS QUESTIONNAIRE, HOW DIFFICULT HAVE THESE PROBLEMS MADE IT FOR YOU TO DO YOUR WORK, TAKE CARE OF THINGS AT HOME, OR GET ALONG WITH OTHER PEOPLE: NOT DIFFICULT AT ALL
6. BECOMING EASILY ANNOYED OR IRRITABLE: NOT AT ALL
4. TROUBLE RELAXING: NOT AT ALL

## 2025-04-03 ASSESSMENT — ACTIVITIES OF DAILY LIVING (ADL)
BATHING: INDEPENDENT
GROCERY_SHOPPING: INDEPENDENT
MANAGING_FINANCES: INDEPENDENT
DOING_HOUSEWORK: INDEPENDENT
TAKING_MEDICATION: INDEPENDENT
DRESSING: INDEPENDENT

## 2025-04-03 ASSESSMENT — PATIENT HEALTH QUESTIONNAIRE - PHQ9
SUM OF ALL RESPONSES TO PHQ9 QUESTIONS 1 AND 2: 0
1. LITTLE INTEREST OR PLEASURE IN DOING THINGS: NOT AT ALL
SUM OF ALL RESPONSES TO PHQ9 QUESTIONS 1 AND 2: 0
2. FEELING DOWN, DEPRESSED OR HOPELESS: NOT AT ALL
2. FEELING DOWN, DEPRESSED OR HOPELESS: NOT AT ALL
1. LITTLE INTEREST OR PLEASURE IN DOING THINGS: NOT AT ALL

## 2025-04-03 ASSESSMENT — COLUMBIA-SUICIDE SEVERITY RATING SCALE - C-SSRS
1. IN THE PAST MONTH, HAVE YOU WISHED YOU WERE DEAD OR WISHED YOU COULD GO TO SLEEP AND NOT WAKE UP?: NO
6. HAVE YOU EVER DONE ANYTHING, STARTED TO DO ANYTHING, OR PREPARED TO DO ANYTHING TO END YOUR LIFE?: NO
2. HAVE YOU ACTUALLY HAD ANY THOUGHTS OF KILLING YOURSELF?: NO

## 2025-04-03 ASSESSMENT — ENCOUNTER SYMPTOMS
DEPRESSION: 0
OCCASIONAL FEELINGS OF UNSTEADINESS: 0
LOSS OF SENSATION IN FEET: 0

## 2025-04-03 NOTE — ASSESSMENT & PLAN NOTE
Discussed advanced medical directives with 's medical power of  up-to-date with vaccinations and screenings reevaluate in 3 months

## 2025-04-03 NOTE — ASSESSMENT & PLAN NOTE
Vitamin D levels optimal at 52 continue  Orders:    Follow Up In [facility] - PCP - Medicare Annual

## 2025-04-03 NOTE — ASSESSMENT & PLAN NOTE
Lipid panel stable with LDL of 93 patient discontinued simvastatin  Orders:    Follow Up In [facility] - PCP - Medicare Annual

## 2025-04-03 NOTE — ASSESSMENT & PLAN NOTE
Patient did not initiate therapy with under alendronate hold while workup of nocturnal sweats ongoing  Orders:    Follow Up In [facility] - PCP - Medicare Annual

## 2025-04-03 NOTE — PROGRESS NOTES
"Number Subjective   Reason for Visit: Jess Pagan is an 74 y.o. female here for a Medicare Wellness visit.     Past Medical, Surgical, and Family History reviewed and updated in chart.    Reviewed all medications by prescribing practitioner or clinical pharmacist (such as prescriptions, OTCs, herbal therapies and supplements) and documented in the medical record.    HPI    Patient Care Team:  Mayo Rosas DO as PCP - General  Mayo Rosas DO as PCP - [employer] PCP     Review of Systems   All other systems reviewed and are negative.      Objective   Vitals:  /78   Pulse 66   Ht 1.575 m (5' 2\")   Wt 54.4 kg (120 lb)   BMI 21.95 kg/m²       Physical Exam  Vitals and nursing note reviewed.   Constitutional:       General: She is not in acute distress.     Appearance: Normal appearance. She is well-developed. She is not toxic-appearing.   HENT:      Head: Normocephalic and atraumatic.      Right Ear: Tympanic membrane and external ear normal.      Left Ear: Tympanic membrane and external ear normal.      Nose: Nose normal.      Mouth/Throat:      Mouth: Mucous membranes are moist.      Pharynx: Oropharynx is clear. No oropharyngeal exudate or posterior oropharyngeal erythema.      Tonsils: No tonsillar exudate. 2+ on the right. 2+ on the left.   Eyes:      Extraocular Movements: Extraocular movements intact.      Conjunctiva/sclera: Conjunctivae normal.   Cardiovascular:      Rate and Rhythm: Normal rate and regular rhythm.      Pulses: Normal pulses.      Heart sounds: Normal heart sounds. No murmur heard.  Pulmonary:      Effort: Pulmonary effort is normal.      Breath sounds: Normal breath sounds.   Abdominal:      General: Abdomen is flat. Bowel sounds are normal.      Palpations: Abdomen is soft.   Musculoskeletal:      Cervical back: Neck supple.   Lymphadenopathy:      Cervical: No cervical adenopathy.   Skin:     General: Skin is warm and dry.      Findings: No rash. "   Neurological:      Mental Status: She is alert. Mental status is at baseline.   Psychiatric:         Mood and Affect: Mood normal.         Behavior: Behavior normal.         Thought Content: Thought content normal.         Judgment: Judgment normal.       Patient Health Questionnaire-2 Score: 0 (4/3/2025 11:46 AM).  This indicates a positive screen but may not meet the criteria for a clinical depression diagnosis. Symptoms were reviewed with Jess.  Follow-up within the next 3 months is recommended to re-assess symptoms and monitor mental health status.    Assessment & Plan  Dyslipidemia, goal LDL below 100  Lipid panel stable with LDL of 93 patient discontinued simvastatin  Orders:    Follow Up In [facility] - PCP - Medicare Annual    Major depression in remission (CMS-HCC)  Taper paroxetine to 10 mg daily causing anticholinergic side effects  Orders:    Follow Up In [facility] - PCP - Medicare Annual    Age-related osteoporosis with current pathological fracture with routine healing  Patient did not initiate therapy with under alendronate hold while workup of nocturnal sweats ongoing  Orders:    Follow Up In [facility] - PCP - Medicare Annual    Mixed stress and urge urinary incontinence  Consider referral to urogynecology for shoulder eval  Orders:    Follow Up In [facility] - PCP - Medicare Annual    Vitamin D deficiency  Vitamin D levels optimal at 52 continue  Orders:    Follow Up In [facility] - PCP - Medicare Annual    Screening mammogram for breast cancer  Schedule screening mammogram       Unexplained night sweats  Thorough history and physical exam reveals increasing sweats noted below the abdomen thorough evaluation revealed no lymphadenopathy on exam patient appears healthy and well denies fatigue signs of infection will check chest x-ray CBC thyroid function and urinalysis and reevaluation close follow-up in 3 months patient with history of  cervical cancer with hysterectomy at age 40 was on hormone replacement after hysterectomy has not had any issues since denies weight loss appetite changes otherwise feels well  Orders:    CBC and Auto Differential; Future    Tsh With Reflex To Free T4 If Abnormal; Future    XR chest 2 views; Future    Urinalysis with Reflex Microscopic; Future    Medicare annual wellness visit, subsequent  Discussed advanced medical directives with 's medical power of  up-to-date with vaccinations and screenings reevaluate in 3 months       Anxiety    Orders:    PARoxetine (Paxil) 20 mg tablet; Take 0.5 tablets (10 mg) by mouth once daily in the morning. Take before meals.           Advance Directives Discussion  16 - 20 minutes were spent discussing Advanced Care Planning (including a Living Will, Medical Power Of , as well as specific end of life choices and/or directives). The details of that discussion were documented in Advanced Directives Discussion section of the medical record.     Cardiac Risk Assessment  15 - 20 minutes were spent discussing Cardiovascular risk and, if needed, lifestyle modifications were recommended, including nutritional choices, exercise, and elimination of habits contributing to risk.   Aspirin use/disuse was discussed following the guidelines below:  low dose ASA ( mg) should be considered:    If prior Heart Attack/Stroke/Peripheral vascular disease:  Generally recommend daily low dose aspirin unless extremely high bleeding risk (e.g., gastrointestinal).    If no prior Heart Attack/Stroke/Peripheral vascular disease:              Age over 70: Do not use Aspirin for prevention    Age less than 70 and 10-year cardiovascular disease risk is >20%: use low dose Aspirin for prevention.                 Depression Screening  5 - 10 minutes were spent screening for depression.

## 2025-04-03 NOTE — PROGRESS NOTES
"Subjective   Reason for Visit: Jess Pagan is an 74 y.o. female here for a Medicare Wellness visit.   Night sweats, shoulder pain left side       Reviewed all medications by prescribing practitioner or clinical pharmacist (such as prescriptions, OTCs, herbal therapies and supplements) and documented in the medical record.    HPI    Patient Care Team:  Mayo Rosas DO as PCP - General  Mayo Rosas DO as PCP - [employer] PCP     Review of Systems    Objective   Vitals:  /78   Pulse 66   Ht 1.575 m (5' 2\")   Wt 54.4 kg (120 lb)   BMI 21.95 kg/m²       Physical Exam    Assessment & Plan  Dyslipidemia, goal LDL below 100    Orders:    Follow Up In [facility] - [facility] - Medicare Annual    Major depression in remission (CMS-HCC)    Orders:    Follow Up In [facility] - [facility] - Medicare Annual    Age-related osteoporosis with current pathological fracture with routine healing    Orders:    Follow Up In [facility] - [facility] - Medicare Annual    Mixed stress and urge urinary incontinence    Orders:    Follow Up In [facility] - [facility] - Medicare Annual    Vitamin D deficiency    Orders:    Follow Up In [facility] - [facility] - Medicare Annual              "

## 2025-04-03 NOTE — ASSESSMENT & PLAN NOTE
Taper paroxetine to 10 mg daily causing anticholinergic side effects  Orders:    Follow Up In [facility] - PCP - Medicare Annual

## 2025-04-21 ENCOUNTER — HOSPITAL ENCOUNTER (OUTPATIENT)
Dept: RADIOLOGY | Facility: HOSPITAL | Age: 75
Discharge: HOME | End: 2025-04-21
Payer: MEDICARE

## 2025-04-21 DIAGNOSIS — R61 UNEXPLAINED NIGHT SWEATS: ICD-10-CM

## 2025-04-21 PROCEDURE — 71046 X-RAY EXAM CHEST 2 VIEWS: CPT | Performed by: STUDENT IN AN ORGANIZED HEALTH CARE EDUCATION/TRAINING PROGRAM

## 2025-04-21 PROCEDURE — 71046 X-RAY EXAM CHEST 2 VIEWS: CPT

## 2025-04-22 LAB
APPEARANCE UR: CLEAR
BACTERIA #/AREA URNS HPF: ABNORMAL /HPF
BASOPHILS # BLD AUTO: 61 CELLS/UL (ref 0–200)
BASOPHILS NFR BLD AUTO: 1.2 %
BILIRUB UR QL STRIP: NEGATIVE
COLOR UR: YELLOW
EOSINOPHIL # BLD AUTO: 163 CELLS/UL (ref 15–500)
EOSINOPHIL NFR BLD AUTO: 3.2 %
ERYTHROCYTE [DISTWIDTH] IN BLOOD BY AUTOMATED COUNT: 12.9 % (ref 11–15)
GLUCOSE UR QL STRIP: NEGATIVE
HCT VFR BLD AUTO: 41 % (ref 35–45)
HGB BLD-MCNC: 13.4 G/DL (ref 11.7–15.5)
HGB UR QL STRIP: NEGATIVE
HYALINE CASTS #/AREA URNS LPF: ABNORMAL /LPF
KETONES UR QL STRIP: NEGATIVE
LEUKOCYTE ESTERASE UR QL STRIP: ABNORMAL
LYMPHOCYTES # BLD AUTO: 2244 CELLS/UL (ref 850–3900)
LYMPHOCYTES NFR BLD AUTO: 44 %
MCH RBC QN AUTO: 28.9 PG (ref 27–33)
MCHC RBC AUTO-ENTMCNC: 32.7 G/DL (ref 32–36)
MCV RBC AUTO: 88.4 FL (ref 80–100)
MONOCYTES # BLD AUTO: 352 CELLS/UL (ref 200–950)
MONOCYTES NFR BLD AUTO: 6.9 %
NEUTROPHILS # BLD AUTO: 2280 CELLS/UL (ref 1500–7800)
NEUTROPHILS NFR BLD AUTO: 44.7 %
NITRITE UR QL STRIP: NEGATIVE
PH UR STRIP: 5.5 [PH] (ref 5–8)
PLATELET # BLD AUTO: 249 THOUSAND/UL (ref 140–400)
PMV BLD REES-ECKER: 10 FL (ref 7.5–12.5)
PROT UR QL STRIP: NEGATIVE
RBC # BLD AUTO: 4.64 MILLION/UL (ref 3.8–5.1)
RBC #/AREA URNS HPF: ABNORMAL /HPF
SERVICE CMNT-IMP: ABNORMAL
SP GR UR STRIP: 1.02 (ref 1–1.03)
SQUAMOUS #/AREA URNS HPF: ABNORMAL /HPF
TSH SERPL-ACNC: 2.5 MIU/L (ref 0.4–4.5)
WBC # BLD AUTO: 5.1 THOUSAND/UL (ref 3.8–10.8)
WBC #/AREA URNS HPF: ABNORMAL /HPF

## 2025-05-07 ENCOUNTER — HOSPITAL ENCOUNTER (OUTPATIENT)
Dept: RADIOLOGY | Facility: HOSPITAL | Age: 75
Discharge: HOME | End: 2025-05-07
Payer: MEDICARE

## 2025-05-07 VITALS — WEIGHT: 120 LBS | HEIGHT: 62 IN | BODY MASS INDEX: 22.08 KG/M2

## 2025-05-07 DIAGNOSIS — Z12.31 SCREENING MAMMOGRAM FOR BREAST CANCER: ICD-10-CM

## 2025-05-07 PROCEDURE — 77063 BREAST TOMOSYNTHESIS BI: CPT | Performed by: RADIOLOGY

## 2025-05-07 PROCEDURE — 77063 BREAST TOMOSYNTHESIS BI: CPT

## 2025-05-07 PROCEDURE — 77067 SCR MAMMO BI INCL CAD: CPT | Performed by: RADIOLOGY

## 2025-07-08 ENCOUNTER — APPOINTMENT (OUTPATIENT)
Dept: PRIMARY CARE | Facility: CLINIC | Age: 75
End: 2025-07-08
Payer: MEDICARE

## 2025-07-08 VITALS
HEIGHT: 62 IN | SYSTOLIC BLOOD PRESSURE: 104 MMHG | BODY MASS INDEX: 22.08 KG/M2 | WEIGHT: 120 LBS | DIASTOLIC BLOOD PRESSURE: 60 MMHG | HEART RATE: 78 BPM

## 2025-07-08 DIAGNOSIS — N39.46 MIXED STRESS AND URGE URINARY INCONTINENCE: Primary | ICD-10-CM

## 2025-07-08 DIAGNOSIS — F32.5 MAJOR DEPRESSION IN REMISSION: ICD-10-CM

## 2025-07-08 DIAGNOSIS — R61 UNEXPLAINED NIGHT SWEATS: ICD-10-CM

## 2025-07-08 DIAGNOSIS — M80.00XD AGE-RELATED OSTEOPOROSIS WITH CURRENT PATHOLOGICAL FRACTURE WITH ROUTINE HEALING: ICD-10-CM

## 2025-07-08 PROCEDURE — 3008F BODY MASS INDEX DOCD: CPT | Performed by: INTERNAL MEDICINE

## 2025-07-08 PROCEDURE — 99213 OFFICE O/P EST LOW 20 MIN: CPT | Performed by: INTERNAL MEDICINE

## 2025-07-08 PROCEDURE — 1159F MED LIST DOCD IN RCRD: CPT | Performed by: INTERNAL MEDICINE

## 2025-07-08 PROCEDURE — 1036F TOBACCO NON-USER: CPT | Performed by: INTERNAL MEDICINE

## 2025-07-08 PROCEDURE — G2211 COMPLEX E/M VISIT ADD ON: HCPCS | Performed by: INTERNAL MEDICINE

## 2025-07-08 PROCEDURE — 1160F RVW MEDS BY RX/DR IN RCRD: CPT | Performed by: INTERNAL MEDICINE

## 2025-07-08 RX ORDER — PAROXETINE 10 MG/1
10 TABLET, FILM COATED ORAL EVERY MORNING
Start: 2025-07-08 | End: 2026-07-08

## 2025-07-08 ASSESSMENT — ANXIETY QUESTIONNAIRES
3. WORRYING TOO MUCH ABOUT DIFFERENT THINGS: NOT AT ALL
1. FEELING NERVOUS, ANXIOUS, OR ON EDGE: NOT AT ALL
4. TROUBLE RELAXING: NOT AT ALL
GAD7 TOTAL SCORE: 0
6. BECOMING EASILY ANNOYED OR IRRITABLE: NOT AT ALL
5. BEING SO RESTLESS THAT IT IS HARD TO SIT STILL: NOT AT ALL
IF YOU CHECKED OFF ANY PROBLEMS ON THIS QUESTIONNAIRE, HOW DIFFICULT HAVE THESE PROBLEMS MADE IT FOR YOU TO DO YOUR WORK, TAKE CARE OF THINGS AT HOME, OR GET ALONG WITH OTHER PEOPLE: NOT DIFFICULT AT ALL
7. FEELING AFRAID AS IF SOMETHING AWFUL MIGHT HAPPEN: NOT AT ALL
2. NOT BEING ABLE TO STOP OR CONTROL WORRYING: NOT AT ALL

## 2025-07-08 NOTE — PROGRESS NOTES
"Resolved with treatment of UTI and reduction in dose of Subjective   Reason for Visit: Jess Pagan is an 74 y.o. female here for a Medicare Wellness visit.     Past Medical, Surgical, and Family History reviewed and updated in chart.    Reviewed all medications by prescribing practitioner or clinical pharmacist (such as prescriptions, OTCs, herbal therapies and supplements) and documented in the medical record.    HPI    Patient Care Team:  Mayo Rosas DO as PCP - General  Mayo Rosas DO as PCP - [employer] PCP     Review of Systems   All other systems reviewed and are negative.      Objective   Vitals:  /60   Pulse 78   Ht 1.575 m (5' 2\")   Wt 54.4 kg (120 lb)   BMI 21.95 kg/m²       Physical Exam  Vitals and nursing note reviewed.   Constitutional:       General: She is not in acute distress.     Appearance: Normal appearance. She is well-developed. She is not toxic-appearing.   HENT:      Head: Normocephalic and atraumatic.      Right Ear: Tympanic membrane and external ear normal.      Left Ear: Tympanic membrane and external ear normal.      Nose: Nose normal.      Mouth/Throat:      Mouth: Mucous membranes are moist.      Pharynx: Oropharynx is clear. No oropharyngeal exudate or posterior oropharyngeal erythema.      Tonsils: No tonsillar exudate. 2+ on the right. 2+ on the left.   Eyes:      Extraocular Movements: Extraocular movements intact.      Conjunctiva/sclera: Conjunctivae normal.   Cardiovascular:      Rate and Rhythm: Normal rate and regular rhythm.      Pulses: Normal pulses.      Heart sounds: Normal heart sounds. No murmur heard.  Pulmonary:      Effort: Pulmonary effort is normal.      Breath sounds: Normal breath sounds.   Abdominal:      General: Abdomen is flat. Bowel sounds are normal.      Palpations: Abdomen is soft.   Musculoskeletal:      Cervical back: Neck supple.   Lymphadenopathy:      Cervical: No cervical adenopathy.   Skin:     General: " Skin is warm and dry.      Findings: No rash.   Neurological:      Mental Status: She is alert. Mental status is at baseline.   Psychiatric:         Mood and Affect: Mood normal.         Behavior: Behavior normal.         Thought Content: Thought content normal.         Judgment: Judgment normal.         Assessment & Plan  Unexplained night sweats  Paroxetine to 10 mg daily and treatment of urinary tract infection  Orders:    Follow Up In [facility]    Follow Up In [facility] - PCP - Medicare Annual; Future    Mixed stress and urge urinary incontinence  Offered referral for urogynecology patient would like to hold off at this time letter written for jury duty excuse due to extent of urinary incontinence  Orders:    Follow Up In [facility] - PCP - Medicare Annual; Future    Major depression in remission  Continue paroxetine 10 mg nightly reevaluate next visit  Orders:    PARoxetine (Paxil) 10 mg tablet; Take 1 tablet (10 mg) by mouth once daily in the morning.    Follow Up In [facility] - PCP - Medicare Annual; Future    Age-related osteoporosis with current pathological fracture with routine healing  Start therapy with alendronate 70 mg weekly vitamin D levels optimal continue to monitor closely  Orders:    Follow Up In [facility] - PCP - Medicare Annual; Future

## 2025-07-08 NOTE — PROGRESS NOTES
"Subjective   Patient ID: Jess Pagan is a 74 y.o. female who presents for Follow-up.    HPI     Review of Systems    Objective   /60   Pulse 78   Ht 1.575 m (5' 2\")   Wt 54.4 kg (120 lb)   BMI 21.95 kg/m²     Physical Exam    Assessment/Plan          "

## 2025-07-08 NOTE — ASSESSMENT & PLAN NOTE
Continue paroxetine 10 mg nightly reevaluate next visit  Orders:    PARoxetine (Paxil) 10 mg tablet; Take 1 tablet (10 mg) by mouth once daily in the morning.    Follow Up In [facility] - PCP - Medicare Annual; Future

## 2025-07-08 NOTE — LETTER
Jess Pagan  1950 7/8/2025    To Whom This May Concern:    My patient, Jess Pagan, is unable to perform Jury Duty due to a physical condition/ incontinence that renders the prospective juror unfit for jury service.  Should you have any questions please do not hesitate to call.    Thank you for your cooperation.    Sincerely,    Mayo Rosas, DO

## 2025-07-08 NOTE — ASSESSMENT & PLAN NOTE
Start therapy with alendronate 70 mg weekly vitamin D levels optimal continue to monitor closely  Orders:    Follow Up In [facility] - PCP - Medicare Annual; Future

## 2025-07-08 NOTE — ASSESSMENT & PLAN NOTE
Offered referral for urogynecology patient would like to hold off at this time letter written for jury duty excuse due to extent of urinary incontinence  Orders:    Follow Up In [facility] - PCP - Medicare Annual; Future

## 2025-09-03 ENCOUNTER — TELEPHONE (OUTPATIENT)
Dept: PRIMARY CARE | Facility: CLINIC | Age: 75
End: 2025-09-03
Payer: MEDICARE

## 2025-09-03 DIAGNOSIS — F32.5 MAJOR DEPRESSION IN REMISSION: ICD-10-CM

## 2025-09-03 RX ORDER — PAROXETINE 10 MG/1
10 TABLET, FILM COATED ORAL EVERY MORNING
Qty: 30 TABLET | Refills: 11 | Status: SHIPPED | OUTPATIENT
Start: 2025-09-03 | End: 2026-09-03

## 2026-01-08 ENCOUNTER — APPOINTMENT (OUTPATIENT)
Dept: PRIMARY CARE | Facility: CLINIC | Age: 76
End: 2026-01-08
Payer: MEDICARE

## 2026-01-13 ENCOUNTER — APPOINTMENT (OUTPATIENT)
Dept: PRIMARY CARE | Facility: CLINIC | Age: 76
End: 2026-01-13
Payer: MEDICARE